# Patient Record
Sex: MALE | URBAN - METROPOLITAN AREA
[De-identification: names, ages, dates, MRNs, and addresses within clinical notes are randomized per-mention and may not be internally consistent; named-entity substitution may affect disease eponyms.]

---

## 2019-01-08 ENCOUNTER — TELEPHONE (OUTPATIENT)
Dept: TRANSPLANT | Facility: CLINIC | Age: 79
End: 2019-01-08

## 2019-01-08 NOTE — TELEPHONE ENCOUNTER
[facility]plant   Caller: Unspecified (Today, 10:01 AM)             Needs Advice     Reason for call: Hipolito Olmedo is calling to get information about a lung transplant          Communication Preference: [phone]     Additional Information: N/A      Returned patient's call.  He asked what our age cutoff for lung transplant is.  I explained that it is 67 years old.  He states he is 78 and has COPD/emphysema.  Advised him to contact [facility] in [place] to determine his candidacy there.  He verbalized understanding.